# Patient Record
Sex: MALE | Race: WHITE | Employment: FULL TIME | URBAN - METROPOLITAN AREA
[De-identification: names, ages, dates, MRNs, and addresses within clinical notes are randomized per-mention and may not be internally consistent; named-entity substitution may affect disease eponyms.]

---

## 2021-03-25 ENCOUNTER — HOSPITAL ENCOUNTER (EMERGENCY)
Age: 21
Discharge: HOME | End: 2021-03-25
Attending: EMERGENCY MEDICINE
Payer: COMMERCIAL

## 2021-03-25 VITALS
WEIGHT: 147 LBS | HEIGHT: 68 IN | BODY MASS INDEX: 22.28 KG/M2 | SYSTOLIC BLOOD PRESSURE: 110 MMHG | DIASTOLIC BLOOD PRESSURE: 63 MMHG | TEMPERATURE: 101.8 F | OXYGEN SATURATION: 98 % | RESPIRATION RATE: 18 BRPM | HEART RATE: 98 BPM

## 2021-03-25 DIAGNOSIS — J02.0 ACUTE STREPTOCOCCAL PHARYNGITIS: Primary | ICD-10-CM

## 2021-03-25 PROCEDURE — 74011250636 HC RX REV CODE- 250/636: Performed by: PHYSICIAN ASSISTANT

## 2021-03-25 PROCEDURE — 99283 EMERGENCY DEPT VISIT LOW MDM: CPT

## 2021-03-25 PROCEDURE — 96372 THER/PROPH/DIAG INJ SC/IM: CPT

## 2021-03-25 RX ADMIN — METHYLPREDNISOLONE SODIUM SUCCINATE 125 MG: 125 INJECTION, POWDER, FOR SOLUTION INTRAMUSCULAR; INTRAVENOUS at 16:22

## 2021-03-25 RX ADMIN — PENICILLIN G BENZATHINE 1.2 MILLION UNITS: 1200000 INJECTION, SUSPENSION INTRAMUSCULAR at 16:22

## 2021-03-25 NOTE — ED PROVIDER NOTES
Patient is a 23-year-old male who presents to emergency room by EMS after he had a syncopal episode at doctor's office during a blood draw. Patient presented that emergency room for a sore throat, suspected strep pharyngitis. He was tested and found to be strep positive. He has not picked up his prescription for penicillin due to being transferred to the emergency room by EMS for the syncopal episode. Patient and patient mother state this happens all the time when he has blood drawn. Patient denies any difficulty breathing or swallowing. The history is provided by the patient. Sore Throat   This is a new problem. There has been a fever of 101 - 101.9 F. Pertinent negatives include no vomiting, no headaches, no shortness of breath, no trouble swallowing and no stiff neck. He has had exposure to strep. No past medical history on file. No past surgical history on file. No family history on file.     Social History     Socioeconomic History    Marital status: SINGLE     Spouse name: Not on file    Number of children: Not on file    Years of education: Not on file    Highest education level: Not on file   Occupational History    Not on file   Social Needs    Financial resource strain: Not on file    Food insecurity     Worry: Not on file     Inability: Not on file    Transportation needs     Medical: Not on file     Non-medical: Not on file   Tobacco Use    Smoking status: Never Smoker    Smokeless tobacco: Never Used   Substance and Sexual Activity    Alcohol use: Not on file    Drug use: Not on file    Sexual activity: Not on file   Lifestyle    Physical activity     Days per week: Not on file     Minutes per session: Not on file    Stress: Not on file   Relationships    Social connections     Talks on phone: Not on file     Gets together: Not on file     Attends Taoism service: Not on file     Active member of club or organization: Not on file     Attends meetings of clubs or organizations: Not on file     Relationship status: Not on file    Intimate partner violence     Fear of current or ex partner: Not on file     Emotionally abused: Not on file     Physically abused: Not on file     Forced sexual activity: Not on file   Other Topics Concern    Not on file   Social History Narrative    Not on file         ALLERGIES: Patient has no known allergies. Review of Systems   Constitutional: Negative for chills and fever. HENT: Positive for sore throat and voice change. Negative for facial swelling and trouble swallowing. Respiratory: Negative for chest tightness and shortness of breath. Cardiovascular: Negative for chest pain. Gastrointestinal: Negative for abdominal pain, nausea and vomiting. Musculoskeletal: Negative for myalgias. Neurological: Negative for headaches. Psychiatric/Behavioral: Negative for confusion. All other systems reviewed and are negative. Vitals:    03/25/21 1605   BP: 110/63   Pulse: 98   Resp: 18   Temp: (!) 101.8 °F (38.8 °C)   SpO2: 98%   Weight: 66.7 kg (147 lb)   Height: 5' 8\" (1.727 m)            Physical Exam  Vitals signs reviewed. Constitutional:       Appearance: Normal appearance. He is well-developed and normal weight. HENT:      Head: Normocephalic and atraumatic. Mouth/Throat:      Mouth: Mucous membranes are moist.      Pharynx: Posterior oropharyngeal erythema present. No pharyngeal swelling, oropharyngeal exudate or uvula swelling. Tonsils: Tonsillar exudate present. No tonsillar abscesses. 2+ on the right. 2+ on the left. Eyes:      Pupils: Pupils are equal, round, and reactive to light. Cardiovascular:      Rate and Rhythm: Normal rate and regular rhythm. Pulmonary:      Effort: No respiratory distress. Breath sounds: Normal breath sounds. Abdominal:      Palpations: Abdomen is soft. Tenderness: There is no abdominal tenderness. There is no guarding.    Skin:     General: Skin is warm and dry.   Neurological:      Mental Status: He is alert and oriented to person, place, and time. Mental status is at baseline. Psychiatric:         Mood and Affect: Mood normal.          MDM  Number of Diagnoses or Management Options  Diagnosis management comments: Is a 56-year-old male who presented to emergency room by EMS after a syncopal episode at doctor's office during a blood draw. She has a severe strep infection, he was given Bicillin 1,200,000 units, methylprednisolone 125 mg in the ED. Patient was monitored, did not have any syncopal episodes during medication administration. Advised Tylenol at home for fever as needed, follow-up with PCP. Understands indications to return to emergency room such as difficulty breathing or difficulty swallowing. At time of discharge patient is resting comfortably, no acute distress, nontoxic appearance.     Risk of Complications, Morbidity, and/or Mortality  Presenting problems: moderate  Diagnostic procedures: low  Management options: low    Patient Progress  Patient progress: improved         Procedures

## 2021-03-25 NOTE — ED TRIAGE NOTES
Patient to triage in wheelchair via [facility] from [facility] urgent care. Per ems patient was being treated for strep there and had a near syncopal episode after they poked his finger. Patient states he has history of vagal episodes with near syncope when he goes to the doctor and has any type of procedure done. Patient denies any symptoms at this time.

## 2021-03-26 ENCOUNTER — PATIENT OUTREACH (OUTPATIENT)
Dept: OTHER | Age: 21
End: 2021-03-26

## 2021-03-26 NOTE — PROGRESS NOTES
Verified  and address for HIPAA security. Introduced [employer] for patient. Patient does not identify any Care Management needs at this time and declines services. Patient is 24 yo male seen in ER at [facility] on 3/25/21. Patient was transported to ER by EMS from urgent care facility due to near syncopal episode after blood draw at urgent care. Patient reports this is a usual reaction to having medical procedure. Patient was complaining of sore throat and fever, Strep test was positive at urgent care. In ER patient was treated with LA bicillin 1.2 million units and Solumedrol 125 mg by injection. He was monitored and had no further syncope or other complications and was discharged home and advised to call PCP for follow up if needed. CC spoke with patient, who reports he is doing much better. States he is able to eat and drink with no problems,denies any trouble swallowing or breathing, has low grade fever, but overall much improved. Declines CM services at this time, but has my contact number if needs arise.